# Patient Record
(demographics unavailable — no encounter records)

---

## 2024-12-16 NOTE — HISTORY OF PRESENT ILLNESS
[TextBox_4] : In general doing well  on occasion has mucus  but no increase sob or cough or wheeze very active less stress last ct April16 2024 did get flu shot already on trelegy 200 and nebulizer PRN No cough, wheezing or chest congestion. Feeling globally improved.

## 2024-12-16 NOTE — REASON FOR VISIT
[Follow-Up] : a follow-up visit [Asthma] : asthma [Pulmonary Fibrosis] : pulmonary fibrosis [ILD] : ILD

## 2024-12-16 NOTE — ASSESSMENT
[FreeTextEntry1] : Hold on OFEV unless evidence of progression of fibrotic lung disease. Continue bronchodilator therapy. Continue observation. Not interested in sleep study. F/U HRCT 4/25 prior to f/u Follow-up in 6 months or sooner on a as needed basis.     35 minutes spent in evaluation management and review of studies.

## 2024-12-16 NOTE — PROCEDURE
[FreeTextEntry1] : 12/16/2024 Pulmonary function testing There is a mild to moderate ventilatory impairment in a restrictive pattern. There is a mild reduction in lung volume. There is a mild diffusion impairment. Corrects to normal with lung volume correctio Compared to June 2024 there was a mild decrease in flow rates with stability of diffusion capacity.  Function similar to February 2024.     HRCT  4/16/24 reviewed  No significant change in fibrotic change from 2023 Described mild progression from 2022.

## 2024-12-16 NOTE — PHYSICAL EXAM
[Normal Appearance] : normal appearance [Supple] : supple [No Neck Mass] : no neck mass [No JVD] : no jvd [Normal S1, S2] : normal s1, s2 [No Murmurs] : no murmurs [Clear to Auscultation Bilaterally] : clear to auscultation bilaterally [Normal to Percussion] : normal to percussion [Benign] : benign [Not Tender] : not tender [No HSM] : no hsm [No Clubbing] : no clubbing [No Cyanosis] : no cyanosis [No Edema] : no edema [TextBox_2] : Overweight [TextBox_68] : Rare to 1 plus crackles bases.

## 2024-12-16 NOTE — DISCUSSION/SUMMARY
[FreeTextEntry1] : Dyspnea on exertion.  Some clinical improvement. History of asthma without significant wheezing or decrease in flow rates. ILD clinically and functionally relatively stable. Seeing cardiology.

## 2025-01-07 NOTE — ASSESSMENT
[FreeTextEntry1] : GI re-evaluation. Start Pantoprazole prior to dinner pending.  Hold on OFEV unless evidence of progression of fibrotic lung disease. Continue bronchodilator therapy. Continue observation. Not interested in sleep study. F/U HRCT 4/25 prior to f/u appointment.  Pulmonary status maximized. Pulmonary function adequate to tolerate endoscopy. No pulmonary contraindications to endoscopy. Continue present bronchodilator therapy.      35 minutes spent in evaluation management and review of studies.

## 2025-01-07 NOTE — DISCUSSION/SUMMARY
[FreeTextEntry1] : Preop for endoscopy. S/P Hosp for atrial fib.  During hosp. CXR reported probable bilat eff. left greater than right.  No significant effusion noted on current chest radiograph. Dyspnea on exertion.  Feels baseline Hiatal Hernia large.  History of asthma without significant wheezing or decrease in flow rates. ILD previously clinically and functionally relatively stable. Seeing cardiology.

## 2025-01-07 NOTE — PHYSICAL EXAM
[Normal Appearance] : normal appearance [Supple] : supple [No Neck Mass] : no neck mass [No JVD] : no jvd [Normal S1, S2] : normal s1, s2 [No Murmurs] : no murmurs [Clear to Auscultation Bilaterally] : clear to auscultation bilaterally [Normal to Percussion] : normal to percussion [Benign] : benign [Not Tender] : not tender [No HSM] : no hsm [No Clubbing] : no clubbing [No Cyanosis] : no cyanosis [No Edema] : no edema [TextBox_2] : Overweight [TextBox_68] : Rare to 1 plus crackles bases. Very mild decrease BS left base.

## 2025-01-07 NOTE — REASON FOR VISIT
[Asthma] : asthma [Pulmonary Fibrosis] : pulmonary fibrosis [ILD] : ILD [Follow-Up - From Hospitalization] : a follow-up visit after a recent hospitalization [Abnormal CXR/ Chest CT] : an abnormal CXR/ chest CT

## 2025-01-07 NOTE — HISTORY OF PRESENT ILLNESS
[TextBox_4] : Friday before [place] had feeling heart was not doing well  went to [facility] [place] and told Afib was there one day no water pills given, no changes in meds had CXR  with left basilar opacity, bilateral pleural effusion was given doxy done and completed.  mild cough and AM mucous predom. clear.  asking for Xanax renewal breathing stable No increase sob or cough or wheeze very active last ct April16 2024 on trelegy 200 and nebulizer PRN No cough, wheezing or chest congestion.

## 2025-01-07 NOTE — HISTORY OF PRESENT ILLNESS
[TextBox_4] : Friday before [place] had feeling heart was not doing well  went to [facility] and told Afib was there one day no water pills given, no changes in meds had CXR  with left basilar opacity, bilateral pleural effusion was given doxy done and completed.  mild cough and AM mucous predom. clear.  asking for Xanax renewal breathing stable No increase sob or cough or wheeze very active last ct April16 2024 on trelegy 200 and nebulizer PRN No cough, wheezing or chest congestion.

## 2025-02-18 NOTE — HISTORY OF PRESENT ILLNESS
[TextBox_4] : Preop for hiatal hernia repair. No significant change in resp. status. To be done laparascopically. On Trelegy.  on Eliquis to stop 3 days before.   Fax  [phone] Dr [place]

## 2025-02-18 NOTE — DISCUSSION/SUMMARY
[FreeTextEntry1] : Preop for hiatal hernia repair. S/P Hosp for atrial fib.  During hosp. CXR reported probable bilat eff. left greater than right.  No significant effusion noted on current chest radiograph. Dyspnea on exertion.  Feels baseline Hiatal Hernia large.  History of asthma without significant wheezing or decrease in flow rates. ILD previously clinically and functionally relatively stable. Seeing cardiology.

## 2025-02-18 NOTE — PHYSICAL EXAM
Contacted pt at [facility] number. Two patient Identifiers confirmed. Advised pt per Dr Floyd Hodge. Pt verbalized understanding. [Normal Appearance] : normal appearance [Supple] : supple [No Neck Mass] : no neck mass [No JVD] : no jvd [Normal S1, S2] : normal s1, s2 [No Murmurs] : no murmurs [Clear to Auscultation Bilaterally] : clear to auscultation bilaterally [Normal to Percussion] : normal to percussion [Benign] : benign [Not Tender] : not tender [No HSM] : no hsm [No Clubbing] : no clubbing [No Cyanosis] : no cyanosis [No Edema] : no edema [TextBox_2] : Overweight [TextBox_68] : Rare to 1 plus crackles bases. Very mild decrease BS left base.

## 2025-02-18 NOTE — PROCEDURE
[FreeTextEntry1] : 12/16/2024 Pulmonary function testing There is a mild to moderate ventilatory impairment in a restrictive pattern. There is a mild reduction in lung volume. There is a mild diffusion impairment. Corrects to normal with lung volume correctio Compared to June 2024 there was a mild decrease in flow rates with stability of diffusion capacity.  Function similar to February 2024 02/18/2025 Alfredo Mild restrictive ventilatory impairment.  No change in flow rates compared to July 16, 2021.      HRCT  4/16/24 reviewed  No significant change in fibrotic change from 2023 Described mild progression from 2022.

## 2025-02-18 NOTE — REASON FOR VISIT
[Follow-Up - From Hospitalization] : a follow-up visit after a recent hospitalization [Abnormal CXR/ Chest CT] : an abnormal CXR/ chest CT [Asthma] : asthma [Pulmonary Fibrosis] : pulmonary fibrosis [ILD] : ILD

## 2025-02-18 NOTE — ASSESSMENT
[FreeTextEntry1] :  Hold on OFEV unless evidence of progression of fibrotic lung disease. Continue bronchodilator therapy. Continue observation. Not interested in sleep study. F/U HRCT 4/25 prior to f/u appointment.  Pulmonary status maximized. Pulmonary function adequate to tolerate proposed surgical procedure.  No pulmonary contraindications to surgery Continue present bronchodilator therapy.  Cannot exclude obstructive sleep apnea syndrome in this patient.  Anesthesia should be aware and take MOLLY precautions.    35 minutes spent in evaluation management and review of studies.

## 2025-04-25 NOTE — PHYSICAL EXAM
[Normal Appearance] : normal appearance [Supple] : supple [No Neck Mass] : no neck mass [Normal S1, S2] : normal s1, s2 [No Murmurs] : no murmurs [Clear to Auscultation Bilaterally] : clear to auscultation bilaterally [Normal to Percussion] : normal to percussion [Benign] : benign [Not Tender] : not tender [No HSM] : no hsm [No Clubbing] : no clubbing [No Cyanosis] : no cyanosis [No Edema] : no edema [No Abnormalities] : no abnormalities [TextBox_2] : Overweight [TextBox_68] : Rare to 1 plus crackles bases. Very mild decrease BS left base.

## 2025-04-25 NOTE — HISTORY OF PRESENT ILLNESS
[Never] : never [TextBox_4] : Here for f/u had recent CT abdomen but not a recent HRCT of chest did well with hiatal hernia surgery did well feels more sob even prior to surgery.  a fib resolved on own.  [facility] 1 flight.  No cough or wheeze.  On Trelegy.  on Eliquis   Fax  [phone] Dr [place]

## 2025-04-25 NOTE — PROCEDURE
[FreeTextEntry1] : 04/25/2025 Pulmonary function testing Mild to moderate restrictive ventilatory impairment without significant bronchodilator response. Moderate reduction in lung volume. Moderate diffusion impairment corrects to normal with lung volume correction. Compared to prior study of December 2024 flow rates are stable.  There is a decrease in diffusion capacity.  TLC is unchanged.      HRCT  4/16/24 reviewed  No significant change in fibrotic change from 2023 Described mild progression from 2022.

## 2025-04-25 NOTE — DISCUSSION/SUMMARY
[FreeTextEntry1] : s/p hiatal hernia repair.  Some GI complications. Atrial fibrillation states resolved since hiatal hernia repair. During hosp. CXR reported probable bilat eff. left greater than right.  No significant effusion noted on current chest radiograph or CT .Dyspnea on exertion.  Persistent.  Likely multifactorial related to cardiac and pulmonary disease possible contribution related to weight and conditioning. Hiatal Hernia s/p repair. History of asthma without significant wheezing or decrease in flow rates. ILD previously clinically and functionally relatively stable. Seeing cardiology.

## 2025-04-25 NOTE — ASSESSMENT
[FreeTextEntry1] : Hold on OFEV unless evidence of progression of fibrotic lung disease. Having GI issues as well.  Continue bronchodilator therapy. Continue observation. Not interested in sleep study. F/U 2 months sooner PRN Seeing cardiology and GI.    35 minutes spent in evaluation management and review of studies.